# Patient Record
Sex: FEMALE | Race: WHITE
[De-identification: names, ages, dates, MRNs, and addresses within clinical notes are randomized per-mention and may not be internally consistent; named-entity substitution may affect disease eponyms.]

---

## 2017-12-02 ENCOUNTER — HOSPITAL ENCOUNTER (EMERGENCY)
Dept: HOSPITAL 92 - ERS | Age: 37
Discharge: HOME | End: 2017-12-02
Payer: MEDICARE

## 2017-12-02 DIAGNOSIS — F17.210: ICD-10-CM

## 2017-12-02 DIAGNOSIS — E78.5: ICD-10-CM

## 2017-12-02 DIAGNOSIS — M79.89: Primary | ICD-10-CM

## 2017-12-02 DIAGNOSIS — I10: ICD-10-CM

## 2017-12-02 DIAGNOSIS — M79.604: ICD-10-CM

## 2017-12-02 LAB
ALP SERPL-CCNC: 66 U/L (ref 40–150)
ALT SERPL W P-5'-P-CCNC: 28 U/L (ref 8–55)
ANION GAP SERPL CALC-SCNC: 13 MMOL/L (ref 10–20)
AST SERPL-CCNC: 18 U/L (ref 5–34)
BASOPHILS # BLD AUTO: 0.1 THOU/UL (ref 0–0.2)
BASOPHILS NFR BLD AUTO: 0.9 % (ref 0–1)
BILIRUB SERPL-MCNC: 0.5 MG/DL (ref 0.2–1.2)
BUN SERPL-MCNC: 7 MG/DL (ref 7–18.7)
CALCIUM SERPL-MCNC: 9.7 MG/DL (ref 7.8–10.44)
CHLORIDE SERPL-SCNC: 103 MMOL/L (ref 98–107)
CO2 SERPL-SCNC: 27 MMOL/L (ref 22–29)
CREAT CL PREDICTED SERPL C-G-VRATE: 0 ML/MIN (ref 70–130)
EOSINOPHIL # BLD AUTO: 0.2 THOU/UL (ref 0–0.7)
EOSINOPHIL NFR BLD AUTO: 1.1 % (ref 0–10)
GLOBULIN SER CALC-MCNC: 3.1 G/DL (ref 2.4–3.5)
HCT VFR BLD CALC: 44.9 % (ref 36–47)
LYMPHOCYTES # BLD: 3.2 THOU/UL (ref 1.2–3.4)
LYMPHOCYTES NFR BLD AUTO: 21.9 % (ref 21–51)
MONOCYTES # BLD AUTO: 0.8 THOU/UL (ref 0.11–0.59)
MONOCYTES NFR BLD AUTO: 5.5 % (ref 0–10)
NEUTROPHILS # BLD AUTO: 10.4 THOU/UL (ref 1.4–6.5)
RBC # BLD AUTO: 4.84 MILL/UL (ref 4.2–5.4)
WBC # BLD AUTO: 14.7 THOU/UL (ref 4.8–10.8)

## 2017-12-02 PROCEDURE — 80053 COMPREHEN METABOLIC PANEL: CPT

## 2017-12-02 PROCEDURE — 94640 AIRWAY INHALATION TREATMENT: CPT

## 2017-12-02 PROCEDURE — 36415 COLL VENOUS BLD VENIPUNCTURE: CPT

## 2017-12-02 PROCEDURE — 83880 ASSAY OF NATRIURETIC PEPTIDE: CPT

## 2017-12-02 PROCEDURE — 85025 COMPLETE CBC W/AUTO DIFF WBC: CPT

## 2017-12-02 PROCEDURE — 71020: CPT

## 2017-12-02 PROCEDURE — 96372 THER/PROPH/DIAG INJ SC/IM: CPT

## 2017-12-02 PROCEDURE — 85379 FIBRIN DEGRADATION QUANT: CPT

## 2017-12-02 PROCEDURE — 99406 BEHAV CHNG SMOKING 3-10 MIN: CPT

## 2017-12-02 NOTE — RAD
PA AND LATERAL OF THE CHEST

12/2/17

 

INDICATION:

Cough.

 

COMPARISON:  

None.

 

FINDINGS:  

The lungs are clear. The cardiomediastinal silhouette is within normal limits. No acute osseous abnor
mality is evident.

 

IMPRESSION:  

No acute cardiopulmonary abnormality.

 

POS: [facility]

## 2017-12-02 NOTE — ULT
RIGHT LOWER QUADRANT DOPPLER VENOUS ULTRASOUND

12/2/17

 

INDICATION:

Right calf pain and edema for one week. 

 

TECHNIQUE:  

Gray scale, color doppler and vascular duplex with spectral analysis was performed of the deep venous
 structures of the right lower extremity. Common femoral vein, superficial femoral vein, popliteal ve
in, posterior tibial vein, proximal greater saphenous and profunda veins were assessed.

 

FINDINGS:  

Normal compression, flow, and augmentation seen within the deep venous structures of the right lower 
extremity.

 

IMPRESSION:

No evidence of DVT to the right lower extremity.   

 

 

POS: [facility]

## 2018-04-08 ENCOUNTER — HOSPITAL ENCOUNTER (EMERGENCY)
Dept: HOSPITAL 92 - ERS | Age: 38
Discharge: HOME | End: 2018-04-08
Payer: MEDICARE

## 2018-04-08 PROCEDURE — 99406 BEHAV CHNG SMOKING 3-10 MIN: CPT

## 2018-04-12 ENCOUNTER — HOSPITAL ENCOUNTER (EMERGENCY)
Dept: HOSPITAL 92 - ERS | Age: 38
Discharge: HOME | End: 2018-04-12
Payer: MEDICARE

## 2018-04-12 DIAGNOSIS — F17.210: ICD-10-CM

## 2018-04-12 DIAGNOSIS — E78.5: ICD-10-CM

## 2018-04-12 DIAGNOSIS — K03.81: ICD-10-CM

## 2018-04-12 DIAGNOSIS — I10: ICD-10-CM

## 2018-04-12 DIAGNOSIS — I25.2: ICD-10-CM

## 2018-04-12 DIAGNOSIS — K02.9: Primary | ICD-10-CM

## 2018-04-12 PROCEDURE — 99282 EMERGENCY DEPT VISIT SF MDM: CPT

## 2018-04-14 ENCOUNTER — HOSPITAL ENCOUNTER (EMERGENCY)
Dept: HOSPITAL 92 - ERS | Age: 38
Discharge: HOME | End: 2018-04-14
Payer: MEDICARE

## 2018-04-14 DIAGNOSIS — I25.2: ICD-10-CM

## 2018-04-14 DIAGNOSIS — F17.210: ICD-10-CM

## 2018-04-14 DIAGNOSIS — Z79.899: ICD-10-CM

## 2018-04-14 DIAGNOSIS — E78.5: ICD-10-CM

## 2018-04-14 DIAGNOSIS — Z79.891: ICD-10-CM

## 2018-04-14 DIAGNOSIS — K02.9: Primary | ICD-10-CM

## 2018-04-14 DIAGNOSIS — I10: ICD-10-CM

## 2018-04-14 PROCEDURE — 96372 THER/PROPH/DIAG INJ SC/IM: CPT

## 2018-05-29 ENCOUNTER — HOSPITAL ENCOUNTER (EMERGENCY)
Dept: HOSPITAL 92 - ERS | Age: 38
Discharge: HOME | End: 2018-05-29
Payer: MEDICARE

## 2018-05-29 DIAGNOSIS — I10: ICD-10-CM
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
## 2018-05-29 DIAGNOSIS — I25.2: ICD-10-CM

## 2018-05-29 DIAGNOSIS — Z71.6: ICD-10-CM

## 2018-05-29 DIAGNOSIS — S60.221A: Primary | ICD-10-CM

## 2018-05-29 DIAGNOSIS — E78.5: ICD-10-CM

## 2018-05-29 DIAGNOSIS — F17.210: ICD-10-CM

## 2018-05-29 PROCEDURE — 99406 BEHAV CHNG SMOKING 3-10 MIN: CPT

## 2018-05-29 NOTE — RAD
RIGHT HAND THREE VIEWS:

 

INDICATIONS:

Pain.

 

FINDINGS:

There is no evidence of fracture or dislocation of the right hand.  No radiopaque foreign bodies.

 

IMPRESSION:

No acute osseous abnormality.

 

POS: [facility]

## 2018-08-10 ENCOUNTER — HOSPITAL ENCOUNTER (EMERGENCY)
Dept: HOSPITAL 92 - ERS | Age: 38
Discharge: HOME | End: 2018-08-10
Payer: MEDICARE

## 2018-08-10 DIAGNOSIS — I10: ICD-10-CM

## 2018-08-10 DIAGNOSIS — I25.2: ICD-10-CM

## 2018-08-10 DIAGNOSIS — R59.0: Primary | ICD-10-CM

## 2018-08-10 DIAGNOSIS — Z79.899: ICD-10-CM

## 2018-08-10 DIAGNOSIS — Z71.6: ICD-10-CM

## 2018-08-10 DIAGNOSIS — M54.2: ICD-10-CM

## 2018-08-10 DIAGNOSIS — E78.5: ICD-10-CM

## 2018-08-10 DIAGNOSIS — F17.210: ICD-10-CM

## 2018-08-10 LAB
ALBUMIN SERPL BCG-MCNC: 4.5 G/DL (ref 3.5–5)
ALP SERPL-CCNC: 69 U/L (ref 40–150)
ALT SERPL W P-5'-P-CCNC: 16 U/L (ref 8–55)
ANION GAP SERPL CALC-SCNC: 13 MMOL/L (ref 10–20)
AST SERPL-CCNC: 14 U/L (ref 5–34)
BASOPHILS # BLD AUTO: 0.1 THOU/UL (ref 0–0.2)
BASOPHILS NFR BLD AUTO: 1 % (ref 0–1)
BILIRUB SERPL-MCNC: 0.5 MG/DL (ref 0.2–1.2)
BUN SERPL-MCNC: 13 MG/DL (ref 7–18.7)
CALCIUM SERPL-MCNC: 9.2 MG/DL (ref 7.8–10.44)
CHLORIDE SERPL-SCNC: 104 MMOL/L (ref 98–107)
CK MB SERPL-MCNC: 2.5 NG/ML (ref 0–6.6)
CK SERPL-CCNC: 102 U/L (ref 29–168)
CO2 SERPL-SCNC: 26 MMOL/L (ref 22–29)
CREAT CL PREDICTED SERPL C-G-VRATE: 0 ML/MIN (ref 70–130)
EOSINOPHIL # BLD AUTO: 0.2 THOU/UL (ref 0–0.7)
EOSINOPHIL NFR BLD AUTO: 2.1 % (ref 0–10)
GLOBULIN SER CALC-MCNC: 2.5 G/DL (ref 2.4–3.5)
GLUCOSE SERPL-MCNC: 81 MG/DL (ref 70–105)
HGB BLD-MCNC: 14.1 G/DL (ref 12–16)
LYMPHOCYTES # BLD: 3.2 THOU/UL (ref 1.2–3.4)
LYMPHOCYTES NFR BLD AUTO: 35.5 % (ref 21–51)
MCH RBC QN AUTO: 30.8 PG (ref 27–31)
MCV RBC AUTO: 89.5 FL (ref 78–98)
MONOCYTES # BLD AUTO: 0.8 THOU/UL (ref 0.11–0.59)
MONOCYTES NFR BLD AUTO: 8.3 % (ref 0–10)
NEUTROPHILS # BLD AUTO: 4.8 THOU/UL (ref 1.4–6.5)
NEUTROPHILS NFR BLD AUTO: 53.1 % (ref 42–75)
PLATELET # BLD AUTO: 225 THOU/UL (ref 130–400)
POTASSIUM SERPL-SCNC: 3.8 MMOL/L (ref 3.5–5.1)
RBC # BLD AUTO: 4.59 MILL/UL (ref 4.2–5.4)
SODIUM SERPL-SCNC: 139 MMOL/L (ref 136–145)
TROPONIN I SERPL DL<=0.01 NG/ML-MCNC: (no result) NG/ML (ref ?–0.03)
WBC # BLD AUTO: 9.1 THOU/UL (ref 4.8–10.8)

## 2018-08-10 PROCEDURE — 80053 COMPREHEN METABOLIC PANEL: CPT

## 2018-08-10 PROCEDURE — 85025 COMPLETE CBC W/AUTO DIFF WBC: CPT

## 2018-08-10 PROCEDURE — 96374 THER/PROPH/DIAG INJ IV PUSH: CPT

## 2018-08-10 PROCEDURE — 36415 COLL VENOUS BLD VENIPUNCTURE: CPT

## 2018-08-10 PROCEDURE — 99406 BEHAV CHNG SMOKING 3-10 MIN: CPT

## 2018-08-10 PROCEDURE — 84484 ASSAY OF TROPONIN QUANT: CPT

## 2018-08-10 PROCEDURE — 71045 X-RAY EXAM CHEST 1 VIEW: CPT

## 2018-08-10 PROCEDURE — 83880 ASSAY OF NATRIURETIC PEPTIDE: CPT

## 2018-08-10 PROCEDURE — 93005 ELECTROCARDIOGRAM TRACING: CPT

## 2018-08-10 PROCEDURE — 82553 CREATINE MB FRACTION: CPT

## 2018-08-10 NOTE — RAD
PORTABLE UPRIGHT FRONTAL CHEST RADIOGRAPH:

8/10/18

 

COMPARISON:  

12/2/17.

 

HISTORY: 

Pain. 

 

FINDINGS:  

Heart and mediastinal contours are stable. No pneumothorax, pleural fluid, focal consolidation or norberto
eolar edema. 

 

IMPRESSION:  

No acute findings. 

 

POS: [facility]

## 2018-10-07 ENCOUNTER — HOSPITAL ENCOUNTER (EMERGENCY)
Dept: HOSPITAL 92 - ERS | Age: 38
Discharge: HOME | End: 2018-10-07
Payer: MEDICARE

## 2018-10-07 DIAGNOSIS — I25.2: ICD-10-CM

## 2018-10-07 DIAGNOSIS — I10: ICD-10-CM

## 2018-10-07 DIAGNOSIS — F11.23: Primary | ICD-10-CM

## 2018-10-07 DIAGNOSIS — Z79.899: ICD-10-CM

## 2018-10-07 DIAGNOSIS — E78.5: ICD-10-CM

## 2018-10-07 DIAGNOSIS — F17.210: ICD-10-CM

## 2018-10-07 PROCEDURE — 99283 EMERGENCY DEPT VISIT LOW MDM: CPT
